# Patient Record
Sex: FEMALE
[De-identification: names, ages, dates, MRNs, and addresses within clinical notes are randomized per-mention and may not be internally consistent; named-entity substitution may affect disease eponyms.]

---

## 2019-02-20 ENCOUNTER — APPOINTMENT (OUTPATIENT)
Dept: NEUROSURGERY | Facility: CLINIC | Age: 49
End: 2019-02-20
Payer: COMMERCIAL

## 2019-02-20 VITALS
HEART RATE: 67 BPM | SYSTOLIC BLOOD PRESSURE: 112 MMHG | BODY MASS INDEX: 21.62 KG/M2 | DIASTOLIC BLOOD PRESSURE: 75 MMHG | TEMPERATURE: 98 F | WEIGHT: 122 LBS | HEIGHT: 63 IN

## 2019-02-20 DIAGNOSIS — Z78.9 OTHER SPECIFIED HEALTH STATUS: ICD-10-CM

## 2019-02-20 DIAGNOSIS — Z87.39 PERSONAL HISTORY OF OTHER DISEASES OF THE MUSCULOSKELETAL SYSTEM AND CONNECTIVE TISSUE: ICD-10-CM

## 2019-02-20 DIAGNOSIS — M54.12 RADICULOPATHY, CERVICAL REGION: ICD-10-CM

## 2019-02-20 DIAGNOSIS — M54.16 RADICULOPATHY, LUMBAR REGION: ICD-10-CM

## 2019-02-20 PROCEDURE — 99203 OFFICE O/P NEW LOW 30 MIN: CPT

## 2019-02-20 RX ORDER — HYDROCHLOROTHIAZIDE 12.5 MG/1
12.5 TABLET ORAL
Refills: 0 | Status: ACTIVE | COMMUNITY

## 2019-02-20 RX ORDER — NAPROXEN 500 MG/1
TABLET ORAL
Refills: 0 | Status: ACTIVE | COMMUNITY

## 2019-02-20 RX ORDER — FERROUS FUMARATE 324(106)MG
324 (106 FE) TABLET ORAL
Refills: 0 | Status: ACTIVE | COMMUNITY

## 2019-02-21 PROBLEM — M54.12 CERVICAL RADICULOPATHY: Status: ACTIVE | Noted: 2019-02-20

## 2019-02-21 NOTE — DATA REVIEWED
[de-identified] : MRI cervical spine from 2019 reveals cervical multilevel disopathy.  C2- C3, C3- C4, C4- C5, C5- C6 small disc herniations.  \par C6= C7 annular tear.\par Lumbar MRI reveals T11/T12 left paracentral subligamentous disc bulging currently identified.  \par T12/L1 posterior broad based disc bulge unaltered.  L2/L3 posterior broad based disc bulge centrally with lesser peripheral extensions.  L3/L4 posterior broad based disc bulge with increasing peripheral herniation in the left foramina.\par L4/L5 posterior broad based disc herniation is seen flattening the ventral thecal sac and iextending in the left foramina with narrowing of the left foramina and trace fluid with posterior left margin of the disc compatible with a region of annular tear.  L5/S1 transitional intervertebral disc again with broad based posterior disc bulge extending the abut the ventral S1 nerve roots.

## 2019-02-21 NOTE — ASSESSMENT
[FreeTextEntry1] : Ms. De Jesus presents with above history and imaging from 2/2018.  She continues to report her primary complaint is lower back pain with intermittent radiation to bilateral lower extremities.  Secondary complaint is neck pain with radiation to bilateral upper extremities R > L.  Given her MRI's are greater than a year and she has not improved with conservative measures, I would like to obtain a new MRI of lumbar and cervical spine.   She should follow up after imaging.  She and her  have been given opportunity to ask and have their questions answered.  They should call office if there are any new or worsening symptoms.

## 2019-02-21 NOTE — REASON FOR VISIT
[New Patient Visit] : a new patient visit [Referred By: _________] : Patient was referred by CITLALLI [Spouse] : spouse [FreeTextEntry1] : neck and LBP

## 2019-02-21 NOTE — REVIEW OF SYSTEMS
[Feeling Tired] : feeling tired [As Noted in HPI] : as noted in HPI [Leg Weakness] : leg weakness [Numbness] : numbness [Tingling] : tingling [Difficulty Walking] : difficulty walking [Negative] : Heme/Lymph [Incontinence] : no incontinence [FreeTextEntry9] : neck and LBP

## 2019-02-21 NOTE — HISTORY OF PRESENT ILLNESS
[> 3 months] : more  than 3 months [Pain] : pain [Weakness] : weakness [Numbness] : numbness [Worsening] : worsening [Constant] : ~He/She~ states the symptoms seem to be constant [Sitting] : worsened by sitting [Standing] : worsened by standing [Walking] : worsened by walking [Weight Bearing] : worsened by weight bearing [Chiropractic] : relieved by chiropractic manipulation [Heat] : relieved by heat [NSAIDs] : relieved by nonsteroidal anti-inflammatory drugs [Recumbency] : relieved by recumbency [Rest] : relieved by rest [FreeTextEntry1] : neck and lower back pain  [de-identified] : Ms. De Jesus is a 48 year old right hand dominant female who presents for neurosurgical evaluation of cervical and lumbar disc disease.  No significant medical history.  She mentions her symptoms started in 2007.  No inciting event or trauma.  She reports lower back pain which starts at the waist line with intermittent radiation to bilateral lower extremities R > L.  It is described as constant  aching and numbness.  Pain intensity  8/10.  At its worst 10/10.  She denies any saddle anesthesia, urinary or bowel dysfunction.  In addition, she reports neck/interscapular pain and pain with neck ROM.  She states she has intermittent radiation to bilateral upper extremities R > L.  It is described as numbness and throbbing.  She denies any difficulty with dexterity.  She states the pain overall is aggravated with activities in general.  It is improved with lying down.   She has been evaluated in the past by neurology.  No recent follow up per .  She is under the care of chiropractor, last treatment about 6 months ago.  She has tried physical therapy for several years with little improvement.  Last PT about 5 years ago.  She was under the care of pain management,  Last treatments include lumbar OLEKSANRD's x 2 about 3-4 years ago.  She states she had a functional assessment performed by Dr. Batres in [place] to evaluate her ability to work.  She states she had an EMG in the remote past but unsure of findings.  She manages her symptoms with OTC medications, turmeric and heat/cold application.   [Ataxia] : no ataxia [Incontinence] : no incontinence [Loss of Dexterity] : good dexterity [Urinary Ret.] : no urinary retention

## 2019-02-21 NOTE — PHYSICAL EXAM
[General Appearance - Alert] : alert [General Appearance - In No Acute Distress] : in no acute distress [General Appearance - Well Nourished] : well nourished [General Appearance - Well Developed] : well developed [General Appearance - Well-Appearing] : healthy appearing [] : normal voice and communication [Oriented To Time, Place, And Person] : oriented to person, place, and time [Impaired Insight] : insight and judgment were intact [Affect] : the affect was normal [Mood] : the mood was normal [Memory Recent] : recent memory was not impaired [Memory Remote] : remote memory was not impaired [Person] : oriented to person [Place] : oriented to place [Time] : oriented to time [Short Term Intact] : short term memory intact [Concentration Intact] : normal concentrating ability [Fluency] : fluency intact [Comprehension] : comprehension intact [Cranial Nerves Optic (II)] : visual acuity intact bilaterally,  pupils equal round and reactive to light [Cranial Nerves Oculomotor (III)] : extraocular motion intact [Cranial Nerves Trigeminal (V)] : facial sensation intact symmetrically [Cranial Nerves Facial (VII)] : face symmetrical [Cranial Nerves Glossopharyngeal (IX)] : tongue and palate midline [Cranial Nerves Accessory (XI - Cranial And Spinal)] : head turning and shoulder shrug symmetric [Cranial Nerves Hypoglossal (XII)] : there was no tongue deviation with protrusion [Motor Tone] : muscle tone was normal in all four extremities [Motor Strength] : muscle strength was normal in all four extremities [Involuntary Movements] : no involuntary movements were seen [No Muscle Atrophy] : normal bulk in all four extremities [Sensation Tactile Decrease] : light touch was intact [Sensation Pain / Temperature Decrease] : pain and temperature was intact [Abnormal Walk] : normal gait [Balance] : balance was intact [No Visual Abnormalities] : no visible abnormailities [Normal] : normal [Able to toe walk] : the patient was able to toe walk [Able to heel walk] : the patient was able to heel walk [Over the Past 2 Weeks, Have You Felt Down, Depressed, or Hopeless?] : 1.) Over the past 2 weeks, have you felt down, depressed, or hopeless? No [Over the Past 2 Weeks, Have You Felt Little Interest or Pleasure Doing Things?] : 2.) Over the past 2 weeks, have you felt little interest or pleasure doing things? No [Straight-Leg Raise Test - Left] : straight leg raise of the left leg was negative [Straight-Leg Raise Test - Right] : straight leg raise  of the right leg was negative

## 2020-02-20 ENCOUNTER — RESULT REVIEW (OUTPATIENT)
Age: 50
End: 2020-02-20

## 2022-11-11 ENCOUNTER — APPOINTMENT (OUTPATIENT)
Dept: ORTHOPEDIC SURGERY | Facility: CLINIC | Age: 52
End: 2022-11-11

## 2022-11-11 VITALS — BODY MASS INDEX: 23 KG/M2 | HEIGHT: 62 IN | WEIGHT: 125 LBS

## 2022-11-11 DIAGNOSIS — Z00.00 ENCOUNTER FOR GENERAL ADULT MEDICAL EXAMINATION W/OUT ABNORMAL FINDINGS: ICD-10-CM

## 2022-11-11 DIAGNOSIS — M19.012 PRIMARY OSTEOARTHRITIS, LEFT SHOULDER: ICD-10-CM

## 2022-11-11 DIAGNOSIS — M77.8 OTHER ENTHESOPATHIES, NOT ELSEWHERE CLASSIFIED: ICD-10-CM

## 2022-11-11 PROCEDURE — 73030 X-RAY EXAM OF SHOULDER: CPT | Mod: LT

## 2022-11-11 PROCEDURE — 20611 DRAIN/INJ JOINT/BURSA W/US: CPT

## 2022-11-11 PROCEDURE — 99203 OFFICE O/P NEW LOW 30 MIN: CPT | Mod: 25

## 2022-11-11 PROCEDURE — J3490M: CUSTOM

## 2022-11-11 NOTE — HISTORY OF PRESENT ILLNESS
[Gradual] : gradual [10] : 10 [9] : 9 [Burning] : burning [Radiating] : radiating [Shooting] : shooting [Stabbing] : stabbing [Nothing helps with pain getting better] : Nothing helps with pain getting better [Disabled] : Work status: disabled [de-identified] : 52 year old RHD female with pain in the left shoulder. Symptoms started about a month ago, no specific injury. PAin with reaching over head, behind back, no radicular complaints. Had CSI R shoulder in past helped [] : no [FreeTextEntry1] : l shoulder [FreeTextEntry5] : l shoulder pain for a month [de-identified] : cannot move the arm/ shoulder

## 2022-11-11 NOTE — REVIEW OF SYSTEMS
[Joint Pain] : joint pain [Joint Stiffness] : joint stiffness [Joint Swelling] : joint swelling [Negative] : Heme/Lymph [FreeTextEntry9] : back pain

## 2022-11-11 NOTE — ASSESSMENT
[FreeTextEntry1] : Patient allowed to gently start resuming activities. \par Discussed change to medication prescription and usage. \par Activity modification as needed\par HEP\par will try CSI\par try lido patch

## 2022-11-11 NOTE — PHYSICAL EXAM
[Sitting] : sitting [Moderate] : moderate [Left] : left shoulder [There are no fractures, subluxations or dislocations. No significant abnormalities are seen] : There are no fractures, subluxations or dislocations. No significant abnormalities are seen [] : no scapular winging [Degenerative change] : Degenerative change [TWNoteComboBox7] : active forward flexion 115 degrees [TWNoteComboBox6] : internal rotation L3 [de-identified] : external rotation 50 degrees

## 2023-10-01 PROBLEM — M54.16 LUMBAR RADICULAR PAIN: Status: ACTIVE | Noted: 2019-02-20
